# Patient Record
Sex: MALE | Race: WHITE | Employment: FULL TIME | URBAN - METROPOLITAN AREA
[De-identification: names, ages, dates, MRNs, and addresses within clinical notes are randomized per-mention and may not be internally consistent; named-entity substitution may affect disease eponyms.]

---

## 2017-10-10 ENCOUNTER — OFFICE VISIT (OUTPATIENT)
Dept: FAMILY MEDICINE CLINIC | Age: 62
End: 2017-10-10

## 2017-10-10 VITALS
SYSTOLIC BLOOD PRESSURE: 144 MMHG | WEIGHT: 176 LBS | DIASTOLIC BLOOD PRESSURE: 92 MMHG | BODY MASS INDEX: 26.07 KG/M2 | HEART RATE: 71 BPM | HEIGHT: 69 IN

## 2017-10-10 DIAGNOSIS — F17.200 SMOKER: ICD-10-CM

## 2017-10-10 DIAGNOSIS — E78.00 HYPERCHOLESTEROLEMIA: ICD-10-CM

## 2017-10-10 DIAGNOSIS — Z00.00 WELLNESS EXAMINATION: Primary | ICD-10-CM

## 2017-10-10 DIAGNOSIS — R03.0 ELEVATED BP WITHOUT DIAGNOSIS OF HYPERTENSION: ICD-10-CM

## 2017-10-10 PROCEDURE — 99396 PREV VISIT EST AGE 40-64: CPT | Performed by: INTERNAL MEDICINE

## 2017-10-10 ASSESSMENT — ENCOUNTER SYMPTOMS
DIARRHEA: 0
NAUSEA: 0
SORE THROAT: 0
RESPIRATORY NEGATIVE: 1
CONSTIPATION: 0
ABDOMINAL PAIN: 0
BLOOD IN STOOL: 0

## 2017-10-10 NOTE — PROGRESS NOTES
Subjective:      Patient ID: Donta Ohara is a 60 y.o. male. Thony Timmons presents for a check up and wellness exam.  Thony Timmons denies new problems. Medications were reviewed with Thony Timmons, he is not on any medication at this time. Bowels are regular. There has not been rectal bleeding. Thony Timmons denies urinary complications, the urine stream is good. Thony Timmons denies chest pain and denies increasing shortness of breath. 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 Thony Timmons states he had an evaluation through the insurance and he was told his BP and cholesterol was elevated. Thony Timmons continues to smoke. He has thought about stop smoking but not at that point yet. Review of Systems   Constitutional: Negative. HENT: Negative for congestion, ear pain, postnasal drip, sneezing and sore throat. Eyes: Negative for visual disturbance. Respiratory: Negative. Cardiovascular: Negative for chest pain, palpitations and leg swelling. Gastrointestinal: Negative for abdominal pain, blood in stool, constipation, diarrhea and nausea. Genitourinary: Negative for difficulty urinating, dysuria, frequency and urgency. Musculoskeletal: Negative for arthralgias, joint swelling, myalgias, neck pain and neck stiffness. Skin: Negative. Neurological: Negative for syncope. Psychiatric/Behavioral: Negative. Objective:   Physical Exam   Constitutional: He is oriented to person, place, and time. He appears well-developed and well-nourished. No distress. HENT:   Head: Normocephalic. Eyes: Conjunctivae are normal.   Neck: Normal range of motion. Neck supple. Cardiovascular: Normal rate, regular rhythm and normal heart sounds. Pulmonary/Chest: Effort normal.   Breath sound decreased in the bases. Abdominal: Soft. There is no tenderness. Musculoskeletal: He exhibits no edema. Lymphadenopathy:     He has no cervical adenopathy.    Neurological: He is alert and oriented to person, place, and time. Skin: No rash noted. Psychiatric: He has a normal mood and affect. His behavior is normal. Thought content normal.       Assessment:      1. Wellness examination     2. Elevated BP without diagnosis of hypertension     3. Smoker     4. Hypercholesterolemia             Plan:      1. Wellness examination  Discussed low cholesterol diet. Discussed stop smoking. 2. Elevated BP without diagnosis of hypertension  Since Jhonny Huggins had an elevated blood pressure in the clinic today, he is instructed to follow up in the clinic in 2 weeks for a repeat blood pressure check. Jhonny Huggins was instructed to take his blood pressure medication at least 2 hours before the appointment. Instructions were also given to avoid caffeine before the blood pressure evaluation and to follow a sodium restricted diet. Attempt to obtain blood pressure measurements outside of the clinic and bring these readings to your office appointment. Dash diet discussed. 3. Smoker  Discussed stop smoking. Jhonny Huggins does not want anything to help him stop smoking at this time. 4. Hypercholesterolemia  Discussed low cholesterol diet. Recommend repeat labs at the wellness fair in February. I discussed the current guidelines for a screening colonoscopy with Jhonny Huggins. The purpose of screening colonoscopy to detect colon polyps or early colon cancer was discussed. Early detection can prevent death from colon cancer. Despite this information, Jhonny Huggins refuses a screening colonoscopy at this time. Jhonny Huggins refuses FIT test today. Jhonny Huggins refuses flu vaccine today. Jhonny Huggins was instructed to follow up in the clinic in 6 months for check up or as needed with any medical issues. Patient given educational material or instruction on dash diet. After visit summary was given to Jhonny Huggins. Discussed use, benefit, and side affects of prescribed medications. Barriers to medication compliance addressed. All patient questions answered.   Pt voiced understanding.

## 2018-04-13 ENCOUNTER — OFFICE VISIT (OUTPATIENT)
Dept: FAMILY MEDICINE CLINIC | Age: 63
End: 2018-04-13

## 2018-04-13 VITALS
DIASTOLIC BLOOD PRESSURE: 104 MMHG | SYSTOLIC BLOOD PRESSURE: 152 MMHG | WEIGHT: 184 LBS | BODY MASS INDEX: 27.25 KG/M2 | HEIGHT: 69 IN | HEART RATE: 80 BPM

## 2018-04-13 DIAGNOSIS — N52.9 ERECTILE DYSFUNCTION, UNSPECIFIED ERECTILE DYSFUNCTION TYPE: ICD-10-CM

## 2018-04-13 DIAGNOSIS — Z83.2 FAMILY HISTORY OF FACTOR V LEIDEN MUTATION: ICD-10-CM

## 2018-04-13 DIAGNOSIS — F17.200 SMOKER: ICD-10-CM

## 2018-04-13 DIAGNOSIS — Z00.00 WELLNESS EXAMINATION: Primary | ICD-10-CM

## 2018-04-13 PROCEDURE — 99213 OFFICE O/P EST LOW 20 MIN: CPT | Performed by: INTERNAL MEDICINE

## 2018-04-13 ASSESSMENT — PATIENT HEALTH QUESTIONNAIRE - PHQ9
SUM OF ALL RESPONSES TO PHQ9 QUESTIONS 1 & 2: 0
2. FEELING DOWN, DEPRESSED OR HOPELESS: 0
1. LITTLE INTEREST OR PLEASURE IN DOING THINGS: 0
SUM OF ALL RESPONSES TO PHQ QUESTIONS 1-9: 0

## 2018-04-13 ASSESSMENT — ENCOUNTER SYMPTOMS
DIARRHEA: 0
RESPIRATORY NEGATIVE: 1
BLOOD IN STOOL: 0
ABDOMINAL PAIN: 0
CONSTIPATION: 0
SORE THROAT: 0
NAUSEA: 0

## 2019-11-29 NOTE — PATIENT INSTRUCTIONS
1. Wellness examination  Discussed low cholesterol diet. Discussed stop smoking. 2. Elevated BP without diagnosis of hypertension  Since Jhonny Huggins had an elevated blood pressure in the clinic today, he is instructed to follow up in the clinic in 2 weeks for a repeat blood pressure check. Jhonny Huggins was instructed to take his blood pressure medication at least 2 hours before the appointment. Instructions were also given to avoid caffeine before the blood pressure evaluation and to follow a sodium restricted diet. Attempt to obtain blood pressure measurements outside of the clinic and bring these readings to your office appointment. Dash diet discussed. 3. Smoker  Discussed stop smoking. Jhonny Huggins does not want anything to help him stop smoking at this time. 4. Hypercholesterolemia  Discussed low cholesterol diet. Recommend repeat labs at the wellness fair in February. Jhonny Huggins was instructed to follow up in the clinic in 6 months for check up or as needed with any medical issues.
97.4

## 2020-05-29 ENCOUNTER — OFFICE VISIT (OUTPATIENT)
Dept: FAMILY MEDICINE CLINIC | Age: 65
End: 2020-05-29

## 2020-05-29 VITALS
TEMPERATURE: 95.1 F | BODY MASS INDEX: 25.18 KG/M2 | WEIGHT: 170 LBS | SYSTOLIC BLOOD PRESSURE: 134 MMHG | HEART RATE: 76 BPM | HEIGHT: 69 IN | DIASTOLIC BLOOD PRESSURE: 84 MMHG

## 2020-05-29 PROCEDURE — 99213 OFFICE O/P EST LOW 20 MIN: CPT | Performed by: INTERNAL MEDICINE

## 2020-05-29 ASSESSMENT — ENCOUNTER SYMPTOMS
DIARRHEA: 0
BLOOD IN STOOL: 0
NAUSEA: 0
RESPIRATORY NEGATIVE: 1
SORE THROAT: 0
ABDOMINAL PAIN: 0
CONSTIPATION: 0

## 2020-05-29 NOTE — PATIENT INSTRUCTIONS
should you call for help? Call your doctor now or seek immediate medical care if:  · You have signs of infection, such as:  ? Increased pain, swelling, warmth, or redness. ? Red streaks leading from the cyst.  ? Pus draining from the cyst.  ? A fever. Watch closely for changes in your health, and be sure to contact your doctor if:  · You have increasing pain. · Your ganglion is getting larger. · You still have pain or numbness from a ganglion. Where can you learn more? Go to https://Sonatype.HylioSoft. org and sign in to your Cellartis account. Enter L528 in the Cittadino box to learn more about \"Ganglions: Care Instructions. \"     If you do not have an account, please click on the \"Sign Up Now\" link. Current as of: March 2, 2020               Content Version: 12.5  © 7151-2802 [employer]. Care instructions adapted under license by [facility] ([facility]). If you have questions about a medical condition or this instruction, always ask your healthcare professional. [address] any warranty or liability for your use of this information. Patient Education        Ganglion Cyst Removal: What to Expect at Home  Your Recovery     Ganglion cyst removal is surgery to remove a ganglion that has caused pain or numbness or made it hard to do your activities. A ganglion is a small sac, or cyst, filled with a clear fluid that is thick like jelly. The cyst may look like a bump on your hand or wrist. Less often, a ganglion can appear on the feet, ankles, knees, or shoulders. The doctor made a cut (incision) in the skin over the ganglion. He or she removed the ganglion and the connecting tissue that allowed fluid to collect there. Then the incision was closed with stitches. You may have a splint over the area to limit movement until the area heals. Your doctor will tell you when it's okay to move the area.  He or she may give you instructions on when you can do your normal activities again. Ganglions sometimes come back. New ganglions also may form in the area. This care sheet gives you a general idea about how long it will take for you to recover. But each person recovers at a different pace. Follow the steps below to get better as quickly as possible. How can you care for yourself at home? Activity  · For 1 to 2 weeks after surgery on your hand or wrist, avoid activities that involve repeated arm or hand movements. These may include typing, using a computer mouse, vacuuming, or carrying things in the affected hand. Do not use power tools. And avoid other activities that make your hand vibrate. · If the procedure involved your foot or ankle, ask your doctor if you need to do less walking or driving for a while. · You may be able to go back to work 1 or 2 days after surgery. It depends on the type of work you do and how you feel. · You may shower, but do not get the area wet until your doctor says it's okay. Keep the bandage dry by covering it with plastic. Do not take a bath, swim, use a hot tub, or soak your hand until your doctor says it's okay. Diet  · You can eat your normal diet when you feel well. If your stomach is upset, try bland, low-fat foods like plain rice, broiled chicken, toast, and yogurt. Medicines  · Your doctor will tell you if and when you can restart your medicines. He or she will also give you instructions about taking any new medicines. · If you take aspirin or some other blood thinner, be sure to talk to your doctor. He or she will tell you if and when to start taking this medicine again. Make sure that you understand exactly what your doctor wants you to do. · Be safe with medicines. Read and follow all instructions on the label. ? If the doctor gave you a prescription medicine for pain, take it as prescribed. ? If you are not taking a prescription pain medicine, ask your doctor if you can take an over-the-counter medicine.   Incision example, call if:  · You passed out (lost consciousness). · You have chest pain, are short of breath, or cough up blood. Call your doctor now or seek immediate medical care if:  · You have pain that does not get better after you take pain medicine. · You have loose stitches, or your incision comes open. · Bright red blood has soaked through the bandage over your incision. · You have symptoms of infection, such as:  ? Increased pain, swelling, warmth, or redness. ? Red streaks leading from the area. ? Pus draining from the area. ? A fever. · The area is cool or pale or changes color. · The area is tingly, weak, or numb. · You can't move the area. · Your splint feels too tight. Watch closely for any changes in your health, and be sure to contact your doctor if:  · You do not get better as expected. Where can you learn more? Go to https://MyToons.Veodia. org and sign in to your AXS-One account. Enter G125 in the TraderTools box to learn more about \"Ganglion Cyst Removal: What to Expect at Home. \"     If you do not have an account, please click on the \"Sign Up Now\" link. Current as of: March 2, 2020               Content Version: 12.5  © 7724-9435 [employer]wise[employer]. Care instructions adapted under license by [facility] ([facility]). If you have questions about a medical condition or this instruction, always ask your healthcare professional. [place] [address] any warranty or liability for your use of this information. 1. Ganglion cyst of tendon sheath of right hand  Reviewed what a ganglion cyst is and information printed off. At this time David Macdonald is not having any pain and doesn't want surgical intervention unless he needs it. Continue to observe for now.

## 2020-10-24 ENCOUNTER — APPOINTMENT (OUTPATIENT)
Dept: GENERAL RADIOLOGY | Age: 65
End: 2020-10-24
Payer: COMMERCIAL

## 2020-10-24 ENCOUNTER — HOSPITAL ENCOUNTER (EMERGENCY)
Age: 65
Discharge: HOME | End: 2020-10-24
Payer: COMMERCIAL

## 2020-10-24 VITALS
HEART RATE: 71 BPM | BODY MASS INDEX: 25.18 KG/M2 | TEMPERATURE: 97.6 F | RESPIRATION RATE: 16 BRPM | DIASTOLIC BLOOD PRESSURE: 77 MMHG | SYSTOLIC BLOOD PRESSURE: 152 MMHG | HEIGHT: 69 IN | OXYGEN SATURATION: 98 % | WEIGHT: 170 LBS

## 2020-10-24 PROCEDURE — 6370000000 HC RX 637 (ALT 250 FOR IP): Performed by: PHYSICIAN ASSISTANT

## 2020-10-24 PROCEDURE — 73590 X-RAY EXAM OF LOWER LEG: CPT

## 2020-10-24 PROCEDURE — 73610 X-RAY EXAM OF ANKLE: CPT

## 2020-10-24 PROCEDURE — 99284 EMERGENCY DEPT VISIT MOD MDM: CPT

## 2020-10-24 PROCEDURE — 73620 X-RAY EXAM OF FOOT: CPT

## 2020-10-24 RX ORDER — HYDROCODONE BITARTRATE AND ACETAMINOPHEN 5; 325 MG/1; MG/1
1 TABLET ORAL EVERY 6 HOURS PRN
Qty: 12 TABLET | Refills: 0 | Status: SHIPPED | OUTPATIENT
Start: 2020-10-24 | End: 2020-10-27

## 2020-10-24 ASSESSMENT — ENCOUNTER SYMPTOMS
RHINORRHEA: 0
VOMITING: 0
SORE THROAT: 0
BACK PAIN: 0
COUGH: 0
SHORTNESS OF BREATH: 0
BLOOD IN STOOL: 0
NAUSEA: 0
DIARRHEA: 0
WHEEZING: 0
CHEST TIGHTNESS: 0
CONSTIPATION: 0
ABDOMINAL PAIN: 0
EYE DISCHARGE: 0
EYE REDNESS: 0

## 2020-10-24 ASSESSMENT — PAIN DESCRIPTION - PAIN TYPE: TYPE: ACUTE PAIN

## 2020-10-24 ASSESSMENT — PAIN SCALES - GENERAL: PAINLEVEL_OUTOF10: 3

## 2020-10-24 ASSESSMENT — PAIN DESCRIPTION - ORIENTATION: ORIENTATION: RIGHT

## 2020-10-24 ASSESSMENT — PAIN DESCRIPTION - LOCATION: LOCATION: ANKLE

## 2020-10-24 NOTE — ED NOTES
Dr. Obdulio English and Josh Art at [address], [address] [place] [address]  10/24/20 0003

## 2020-10-24 NOTE — ED NOTES
Bed: 12  Expected date:   Expected time:   Means of arrival: [facility]  Comments:  Fall/Ankle fx     Jerome Godinez RN  10/24/20 4420 LOV 11/7/16.  Upcoming appointment scheduled for 6/14/17

## 2020-10-24 NOTE — ED PROVIDER NOTES
[facility] ED  eMERGENCY dEPARTMENT eNCOUnter      Pt Name: Vinnie Maravilla  MRN: [id number]  [place] 1955  Date of evaluation: 10/24/2020  Provider: Robin Marcus Dr     Chief Complaint   Patient presents with    Fall     from single story roof  height while cleaning gutters, bottom of ladder kick out and pt rode ladder down to ground pt denies and LOC or hitting head. Pt was walking around upon EMS arrival    Ankle Pain     right ankle, per EMS there is deformity         HISTORY OF PRESENT ILLNESS   (Location/Symptom, Timing/Onset, Context/Setting,Quality, Duration, Modifying Factors, Severity)  Note limiting factors. Vinnie Maravilla is a78 y.o. male who presents to the emergency department with complaints of right ankle injury onset just prior to arrival.  Patient reports that he was cleaning the gutters out of a single-story home when he was on a small step ladder. He states that it started to slip on the deck he rode part of a ladder down and stepped off injuring his right ankle. He denies any head injury or loss of consciousness. States he did not hit any other part of his body. Reports he just has pain in his ankle and so he decided come to the ER. Reports he actually walked on it before he decided he had pain and should probably be seen. He denies any other injury he has no other complaints at this time. Reports UTD on tetanus. HPI    Nursing Notes werereviewed. REVIEW OF SYSTEMS    (2-9 systems for level 4, 10 or more for level 5)     Review of Systems   Constitutional: Negative for chills, diaphoresis and fever. HENT: Negative for congestion, ear pain, rhinorrhea and sore throat. Eyes: Negative for discharge, redness and visual disturbance. Respiratory: Negative for cough, chest tightness, shortness of breath and wheezing. Cardiovascular: Negative for chest pain and palpitations.    Gastrointestinal: Negative for abdominal pain, blood in stool, constipation, diarrhea, nausea and vomiting. Endocrine: Negative for polydipsia, polyphagia and polyuria. Genitourinary: Negative for decreased urine volume, difficulty urinating, dysuria, frequency and hematuria. Musculoskeletal: Positive for arthralgias. Negative for back pain and myalgias. Skin: Negative for pallor and rash. Allergic/Immunologic: Negative for food allergies and immunocompromised state. Neurological: Negative for dizziness, syncope, weakness and light-headedness. Hematological: Negative for adenopathy. Does not bruise/bleed easily. Psychiatric/Behavioral: Negative for behavioral problems and suicidal ideas. The patient is not nervous/anxious. Except as noted above the remainder of the review of systems was reviewed and negative. PAST MEDICAL HISTORY   History reviewed. No pertinent past medical history. SURGICALHISTORY     History reviewed. No pertinent surgical history. CURRENT MEDICATIONS       Previous Medications    SILDENAFIL (VIAGRA) 100 MG TABLET    Take 1 tablet by mouth as needed for Erectile Dysfunction       ALLERGIES     Patient has no known allergies. FAMILY HISTORY     History reviewed. No pertinent family history.        SOCIAL HISTORY       Social History     Socioeconomic History    Marital status:      Spouse name: None    Number of children: None    Years of education: None    Highest education level: None   Occupational History    None   Social Needs    Financial resource strain: None    Food insecurity     Worry: None     Inability: None    Transportation needs     Medical: None     Non-medical: None   Tobacco Use    Smoking status: Current Every Day Smoker     Packs/day: 1.00     Types: Cigarettes     Start date: 1/1/1974    Smokeless tobacco: Never Used   Substance and Sexual Activity    Alcohol use: None    Drug use: Never    Sexual activity: None   Lifestyle    Physical activity     Days per week: None Minutes per session: None    Stress: None   Relationships    Social connections     Talks on phone: None     Gets together: None     Attends Hindu service: None     Active member of club or organization: None     Attends meetings of clubs or organizations: None     Relationship status: None    Intimate partner violence     Fear of current or ex partner: None     Emotionally abused: None     Physically abused: None     Forced sexual activity: None   Other Topics Concern    None   Social History Narrative    None       SCREENINGS    Min Coma Scale  Eye Opening: Spontaneous  Best Verbal Response: Oriented  Best Motor Response: Obeys commands  Rozet Coma Scale Score: 15 @FLOW([id number])@      PHYSICAL EXAM    (up to 7 for level 4, 8 or more for level 5)     ED Triage Vitals [10/24/20 1702]   BP Temp Temp src Pulse Resp SpO2 Height Weight   (!) 150/69 97.6 °F (36.4 °C) -- 71 16 97 % 5' 9\" (1.753 m) 170 lb (77.1 kg)       Physical Exam  Vitals signs and nursing note reviewed. Constitutional:       General: He is not in acute distress. Appearance: Normal appearance. He is well-developed. He is not diaphoretic. HENT:      Head: Normocephalic and atraumatic. Right Ear: External ear normal.      Left Ear: External ear normal.      Mouth/Throat:      Mouth: Mucous membranes are moist.      Pharynx: No oropharyngeal exudate. Eyes:      General: No scleral icterus. Right eye: No discharge. Left eye: No discharge. Conjunctiva/sclera: Conjunctivae normal.      Pupils: Pupils are equal, round, and reactive to light. Neck:      Musculoskeletal: Full passive range of motion without pain, normal range of motion and neck supple. Normal range of motion. No spinous process tenderness or muscular tenderness. Thyroid: No thyromegaly. Trachea: No tracheal deviation. Cardiovascular:      Rate and Rhythm: Normal rate and regular rhythm. Pulses: Normal pulses.       Heart sounds: No murmur. No friction rub. No gallop. Pulmonary:      Effort: Pulmonary effort is normal. No respiratory distress. Breath sounds: Normal breath sounds. No stridor. No wheezing or rhonchi. Abdominal:      General: Bowel sounds are normal. There is no distension. Palpations: Abdomen is soft. Tenderness: There is no abdominal tenderness. There is no guarding or rebound. Hernia: No hernia is present. Musculoskeletal: Normal range of motion. General: Tenderness present. No deformity. Comments: Tenderness and obvious deformity of the right ankle. With swelling present. Intact distal pulses sensation cap refills less than 3 seconds. No open wound. Otherwise no tenderness with maintained range of motion of the right knee and hip. There is no midline bony spinal tenderness or palpable step-off. Full range of motion of left lower extremity and bilateral upper extremities. Small superficial laceration of the middle finger of the left hand. Nothing that crosses the joint line. Is on the palmar aspect. Lymphadenopathy:      Cervical: No cervical adenopathy. Skin:     General: Skin is warm and dry. Capillary Refill: Capillary refill takes less than 2 seconds. Coloration: Skin is not pale. Findings: No erythema or rash. Neurological:      General: No focal deficit present. Mental Status: He is alert and oriented to person, place, and time. Cranial Nerves: No cranial nerve deficit. Motor: No abnormal muscle tone. Deep Tendon Reflexes: Reflexes are normal and symmetric.  Reflexes normal.   Psychiatric:         Behavior: Behavior normal.         DIAGNOSTIC RESULTS     EKG: All EKG's are interpreted by the Emergency Department Physician who either signs orCo-signs this chart in the absence of a cardiologist.      RADIOLOGY:   Non-plainfilm images such as CT, Ultrasound and MRI are read by the radiologist. Plain radiographic images are visualized and preliminarily interpreted by the emergency physician with the below findings:      Interpretationper the Radiologist below, if available at the time of this note:    XR TIBIA FIBULA RIGHT (2 VIEWS)   Final Result   1. Acute bimalleolar fracture of the right ankle with associated joint   effusion and surrounding soft tissue swelling. 2. No acute osseous abnormality right foot. 3. No acute osseous abnormality of the proximal right tibia or fibula. 4. Nonspecific medial forefoot soft tissue swelling. XR ANKLE RIGHT (MIN 3 VIEWS)   Final Result   1. Acute bimalleolar fracture of the right ankle with associated joint   effusion and surrounding soft tissue swelling. 2. No acute osseous abnormality right foot. 3. No acute osseous abnormality of the proximal right tibia or fibula. 4. Nonspecific medial forefoot soft tissue swelling. XR FOOT RIGHT (2 VIEWS)   Final Result   1. Acute bimalleolar fracture of the right ankle with associated joint   effusion and surrounding soft tissue swelling. 2. No acute osseous abnormality right foot. 3. No acute osseous abnormality of the proximal right tibia or fibula. 4. Nonspecific medial forefoot soft tissue swelling. ED BEDSIDE ULTRASOUND:   Performed by ED Physician - none    LABS:  Labs Reviewed - No data to display    All other labs were within normal range or not returned as of this dictation. EMERGENCY DEPARTMENT COURSE and DIFFERENTIAL DIAGNOSIS/MDM:   Vitals:    Vitals:    10/24/20 1745 10/24/20 1755 10/24/20 1800 10/24/20 1808   BP: (!) 145/74  135/72    Pulse:       Resp:       Temp:       SpO2: 97% 99% 96% 98%   Weight:       Height:             MDM  76-year-old male who was cleaning his gutters on a stepladder fell down the stepladder injuring his right ankle. He reports that he slid down part of the ladder and then jumped off.   He did not strike his head he has no back pain he has no neck pain no loss of Intended supply: 3 days. Take lowest dose possible to manage pain              Summation      Patient Course:      ED Medications administered this visit:    Medications   hydrocodone-acetaminophen (NORCO) tablet 5-325 mg (STARTER PACK) (has no administration in time range)       New Prescriptions from this visit:    New Prescriptions    HYDROCODONE-ACETAMINOPHEN (NORCO) 5-325 MG PER TABLET    Take 1 tablet by mouth every 6 hours as needed for Pain for up to 3 days. Intended supply: 3 days. Take lowest dose possible to manage pain       Follow-up:  [facility]  [address]  [phone]    If symptoms worsen, As needed    Nixon Velasquez MD  [address]  [phone]              Final Impression:   1.  Closed fracture of right ankle, initial encounter               (Please note that portions of this note were completed with a voice recognition program.  Efforts were made to edit the dictations but occasionally words are mis-transcribed.)                Kamila Colbert PA-C  10/25/20 7783

## 2020-10-26 ENCOUNTER — TELEPHONE (OUTPATIENT)
Dept: FAMILY MEDICINE CLINIC | Age: 65
End: 2020-10-26

## 2020-10-26 NOTE — TELEPHONE ENCOUNTER
[facility] ED Follow up Call    Reason for ED visit:  Right ankle fracture     10/26/2020     Doron Espana , this is Jeffery Herrera from Dr. Jose De Jesus Singh office, just calling to see how you are doing after your recent ED visit. Did you receive discharge instructions? Yes  Do you understand the discharge instructions? Yes  Did the ED give you any new prescriptions? Yes  Were you able to fill your prescriptions? Yes      Do you have one of our red, yellow and green  Zone sheets that help you to determine when you should go to the ED? No      Do you need or want to make a follow up appt with your PCP? No, pt following up with ortho today    Do you have any further needs in the home, e.g. equipment? No        FU appts/Provider:    No future appointments.

## 2020-10-27 ENCOUNTER — HOSPITAL ENCOUNTER (OUTPATIENT)
Age: 65
Discharge: HOME | End: 2020-10-27
Payer: COMMERCIAL

## 2020-10-27 LAB
ABSOLUTE EOS #: 0.29 K/UL (ref 0–0.44)
ABSOLUTE IMMATURE GRANULOCYTE: 0.03 K/UL (ref 0–0.3)
ABSOLUTE LYMPH #: 1.85 K/UL (ref 1.1–3.7)
ABSOLUTE MONO #: 0.93 K/UL (ref 0.1–1.2)
ANION GAP SERPL CALCULATED.3IONS-SCNC: 8 MMOL/L (ref 9–17)
BASOPHILS # BLD: 1 % (ref 0–2)
BASOPHILS ABSOLUTE: 0.07 K/UL (ref 0–0.2)
BUN BLDV-MCNC: 19 MG/DL (ref 8–23)
BUN/CREAT BLD: 17 (ref 9–20)
CALCIUM SERPL-MCNC: 9.4 MG/DL (ref 8.6–10.4)
CHLORIDE BLD-SCNC: 104 MMOL/L (ref 98–107)
CO2: 25 MMOL/L (ref 20–31)
CREAT SERPL-MCNC: 1.11 MG/DL (ref 0.7–1.2)
DIFFERENTIAL TYPE: ABNORMAL
EKG ATRIAL RATE: 88 BPM
EKG P AXIS: 46 DEGREES
EKG P-R INTERVAL: 150 MS
EKG Q-T INTERVAL: 354 MS
EKG QRS DURATION: 88 MS
EKG QTC CALCULATION (BAZETT): 428 MS
EKG R AXIS: 69 DEGREES
EKG T AXIS: 80 DEGREES
EKG VENTRICULAR RATE: 88 BPM
EOSINOPHILS RELATIVE PERCENT: 3 % (ref 1–4)
GFR AFRICAN AMERICAN: >60 ML/MIN
GFR NON-AFRICAN AMERICAN: >60 ML/MIN
GFR SERPL CREATININE-BSD FRML MDRD: ABNORMAL ML/MIN/{1.73_M2}
GFR SERPL CREATININE-BSD FRML MDRD: ABNORMAL ML/MIN/{1.73_M2}
GLUCOSE BLD-MCNC: 115 MG/DL (ref 70–99)
HCT VFR BLD CALC: 42.7 % (ref 40.7–50.3)
HEMOGLOBIN: 13.7 G/DL (ref 13–17)
IMMATURE GRANULOCYTES: 0 %
LYMPHOCYTES # BLD: 18 % (ref 24–43)
MCH RBC QN AUTO: 29.7 PG (ref 25.2–33.5)
MCHC RBC AUTO-ENTMCNC: 32.1 G/DL (ref 28.4–34.8)
MCV RBC AUTO: 92.6 FL (ref 82.6–102.9)
MONOCYTES # BLD: 9 % (ref 3–12)
NRBC AUTOMATED: 0 PER 100 WBC
PDW BLD-RTO: 13 % (ref 11.8–14.4)
PLATELET # BLD: 277 K/UL (ref 138–453)
PLATELET ESTIMATE: ABNORMAL
PMV BLD AUTO: 9.4 FL (ref 8.1–13.5)
POTASSIUM SERPL-SCNC: 4.5 MMOL/L (ref 3.7–5.3)
RBC # BLD: 4.61 M/UL (ref 4.21–5.77)
RBC # BLD: ABNORMAL 10*6/UL
SEG NEUTROPHILS: 69 % (ref 36–65)
SEGMENTED NEUTROPHILS ABSOLUTE COUNT: 7.15 K/UL (ref 1.5–8.1)
SODIUM BLD-SCNC: 137 MMOL/L (ref 135–144)
WBC # BLD: 10.3 K/UL (ref 3.5–11.3)
WBC # BLD: ABNORMAL 10*3/UL

## 2020-10-27 PROCEDURE — 93005 ELECTROCARDIOGRAM TRACING: CPT | Performed by: NURSE PRACTITIONER

## 2020-10-27 PROCEDURE — 36415 COLL VENOUS BLD VENIPUNCTURE: CPT

## 2020-10-27 PROCEDURE — 85025 COMPLETE CBC W/AUTO DIFF WBC: CPT

## 2020-10-27 PROCEDURE — 80048 BASIC METABOLIC PNL TOTAL CA: CPT

## 2020-10-27 PROCEDURE — 93010 ELECTROCARDIOGRAM REPORT: CPT | Performed by: FAMILY MEDICINE
